# Patient Record
(demographics unavailable — no encounter records)

---

## 2025-01-16 NOTE — LETTER BODY
[FreeTextEntry1] : Uriel Marshall MD [address] [place] [address] [phone]  Dear Dr. Marshall,  Reason for visit: Abnormal imaging. Left renal colic. Ureteral stone.       This is a 69 year-old woman with abnormal abdominal imaging. Patient underwent a CT scan, which demonstrated a 7 mm distal ureteral stone.. Patient denies any flank pain, hematuria, or urinary difficulties. Patient denies any urinary incontinence. The patient denies any aggravating or relieving factors. The patient denies any interference of function. The patient is entirely asymptomatic. All other review of systems are negative. She has no cancer in her family medical history. She has no previous surgical history. Past medical history, family history and social history were inquired and were noncontributory to current condition. The patient does not use tobacco or drink alcohol. Medications and allergies were reviewed. She has no known allergies to medication.       On examination, the patient is a healthy-appearing woman in no acute distress. She is alert and oriented follows commands. She has normal mood and affect. She is normocephalic. Neck is supple. Oral no thrush. Respirations are unlabored. Abdomen is soft and nontender. Bladder is nonpalpable. No CVA tenderness. Neurologically she is grossly intact. No peripheral edema. Skin without gross abnormality.    I personally reviewed the CT scan with patient today. CT scan demonstrated 7 mm distal ureteral stone.    Assessment: Abnormal urinary imaging. Left renal colic. Ureteral stone.       I counseled the patient.  Patient underwent left ureteral stent placement previously.  I recommended she undergo left ureteroscopy, laser lithotripsy, stone extraction, and stent placement. I counseled the patient regarding the procedure. The risks and benefits were discussed. Alternatives were given. I answered the patient questions. The patient will take the necessary preparations for the procedure, including preop labs with activated partial thromboplastin time, BMP, CBC w/ DIFF, culture, prothrombin TIME and INR. I also recommended she begin a trial of Flomax. I discussed the potential side effects of the medication. I counseled the patient on its use and side effects. If the patient develops any side effects, the patient will discontinue medication and contact me. The patient will follow-up as directed and will contact me with any questions or concerns. Thank you for the opportunity to participate in the care of Ms. JUAREZ. I will keep you updated on her progress.       Plan: Left ureteroscopy. Trial of Flomax. Preop labs. Follow-up as directed.

## 2025-01-16 NOTE — ADDENDUM
[FreeTextEntry1] : Entered by Brown Reagan, acting as scribe for Dr. Alireza Sandy. The documentation recorded by the scribe accurately reflects the service I personally performed and the decisions made by me.

## 2025-01-16 NOTE — HISTORY OF PRESENT ILLNESS
[FreeTextEntry1] : New female abnormal imaging.  Patient developed left renal colic.  Patient went CT scan imaging which demonstrates submillimeter left distal ureteral stone.  She underwent stent placement for colic.  I personally reviewed the CT scan with patient today. CT scan demonstrated 7 mm distal ureteral stone.  Plan: Left ureteroscopy.  Flomax.  Preop labs.  Please refer to URO Consult Note.

## 2025-04-29 NOTE — LETTER BODY
[FreeTextEntry1] :  Uriel Marshall MD [address] [place] [address] [phone]  Dear Dr. Marshall,  Reason for visit: Left ureteral stone s/p previous left ureteroscopy. UTI. Right renal stone.    This is a 69 year-old woman with a left ureteral stone status post left ureteroscopy presenting with UTI and right renal stone. Her CT scan demonstrated a 7 mm distal left ureteral stone. She is status post left ureteroscopy, laser lithotripsy, stone extraction, and stent placement and removal. Patient returns today for follow-up. Since her last visit, patient reports of recent UTI. Her urine culture was positive. She is currently taking antibiotics. She also has a large right renal stone. Patient denies any flank pain, hematuria, or urinary difficulties. Patient denies any urinary incontinence. The patient denies any aggravating or relieving factors. The patient denies any interference of function. The patient is entirely asymptomatic. All other review of systems are negative. She has no cancer in her family medical history. She has no previous surgical history. Past medical history, family history and social history were inquired and were noncontributory to current condition. The patient does not use tobacco or drink alcohol. Medications and allergies were reviewed. She has no known allergies to medication.    On examination, the patient is a healthy-appearing woman in no acute distress. She is alert and oriented follows commands. She has normal mood and affect. She is normocephalic. Neck is supple. Oral no thrush. Respirations are unlabored. Abdomen is soft and nontender. Bladder is nonpalpable. No CVA tenderness. Neurologically she is grossly intact. No peripheral edema. Skin without gross abnormality.   Assessment: Left ureteroscopy stone s/p left ureteroscopy. UTI. Right renal stone.    I counseled the patient. In terms of her UTI, patient obtained a recent positive urine culture. She is currently on antibiotic therapy. I recommended the patient obtain urinalysis, urine culture, and urine cytology to evaluate for infections and high grade urothelial carcinoma. In terms of her right renal stone, I recommended the patient obtain ultrasound to ensure stability. I counseled the patient regarding the procedure. The patient will take the necessary preparations for the procedure. She may consider right ureteroscopy, laser lithotripsy, stone extraction, and stent placement. The patient will follow-up as directed and will contact me with any questions or concerns. Thank you for the opportunity to participate in the care of Ms. JUAREZ. I will keep you updated on her progress.    Plan: Urinalysis. Urine cytology. Urine culture. Renal ultrasound. Consider right ureteroscopy.   I personally reviewed the ultrasound images with the patient today. Images demonstrated severe right hydroureteronephrosis with 2 right renal stones. Her 1.3 cm stone has migrated to her right distal ureter.   Patient has right hydronephrosis secondary to right ureteral stone obstruction. I discussed the treatment options available. Patient opted for surgery. I recommended the patient undergo right ureteroscopy, laser lithotripsy, stone extraction, and stent placement with CVAC. I counseled the patient regarding the procedure. The patient will take the necessary preparations for the procedure, including preop labs with activated partial thromboplastin time, BMP, CBC w/ DIFF, culture, prothrombin TIME and INR.   Plan: Right ureteroscopy with CVAC. Preop labs. Follow-up as directed.

## 2025-04-29 NOTE — LETTER BODY
[FreeTextEntry1] :  Uriel Marshall MD [address] [place] [address] [phone]  Dear Dr. Marshall,  Reason for visit: Left ureteral stone s/p previous left ureteroscopy. UTI. Right renal stone.    This is a 69 year-old woman with a left ureteral stone status post left ureteroscopy presenting with UTI and right renal stone. Her CT scan demonstrated a 7 mm distal left ureteral stone. She is status post left ureteroscopy, laser lithotripsy, stone extraction, and stent placement and removal. Patient returns today for follow-up. Since her last visit, patient reports of recent UTI. Her urine culture was positive. She is currently taking antibiotics. She also has a large right renal stone. Patient denies any flank pain, hematuria, or urinary difficulties. Patient denies any urinary incontinence. The patient denies any aggravating or relieving factors. The patient denies any interference of function. The patient is entirely asymptomatic. All other review of systems are negative. She has no cancer in her family medical history. She has no previous surgical history. Past medical history, family history and social history were inquired and were noncontributory to current condition. The patient does not use tobacco or drink alcohol. Medications and allergies were reviewed. She has no known allergies to medication.    On examination, the patient is a healthy-appearing woman in no acute distress. She is alert and oriented follows commands. She has normal mood and affect. She is normocephalic. Neck is supple. Oral no thrush. Respirations are unlabored. Abdomen is soft and nontender. Bladder is nonpalpable. No CVA tenderness. Neurologically she is grossly intact. No peripheral edema. Skin without gross abnormality.   Assessment: Left ureteroscopy stone s/p left ureteroscopy. UTI. Right renal stone.    I counseled the patient. In terms of her UTI, patient obtained a recent positive urine culture. She is currently on antibiotic therapy. I recommended the patient obtain urinalysis, urine culture, and urine cytology to evaluate for infections and high grade urothelial carcinoma. In terms of her right renal stone, I recommended the patient obtain ultrasound to ensure stability. I counseled the patient regarding the procedure. The patient will take the necessary preparations for the procedure. She may consider right ureteroscopy, laser lithotripsy, stone extraction, and stent placement. The patient will follow-up as directed and will contact me with any questions or concerns. Thank you for the opportunity to participate in the care of Ms. JUAREZ. I will keep you updated on her progress.    Plan: Urinalysis. Urine cytology. Urine culture. Renal ultrasound. Consider right ureteroscopy.   I personally reviewed the ultrasound images with the patient today. Images demonstrated severe right hydroureteronephrosis with 2 right renal stones. Her 1.3 cm stone has migrated to her right distal ureter.   Patient has right hydronephrosis secondary to right ureteral stone obstruction. I discussed the treatment options available. Patient opted for surgery. I recommended the patient undergo right ureteroscopy, laser lithotripsy, stone extraction, and stent placement with CVAC. I counseled the patient regarding the procedure. The patient will take the necessary preparations for the procedure, including preop labs with activated partial thromboplastin time, BMP, CBC w/ DIFF, culture, prothrombin TIME and INR.   Plan: Right ureteroscopy with CVAC. Preop labs. Follow-up as directed.

## 2025-04-29 NOTE — HISTORY OF PRESENT ILLNESS
[FreeTextEntry1] : s/p left ureteralscopy to remove left ureteral stone, stent removed on March 03 2025. Reports fever about 2 weeks ago, last for 5 days, saw by PCP, treated with PO ABx, fever resolved but reports lower abdominal discomfort. Denied any other urological symptom.   Please refer to URO Consult Note.